# Patient Record
Sex: FEMALE | Race: WHITE | NOT HISPANIC OR LATINO
[De-identification: names, ages, dates, MRNs, and addresses within clinical notes are randomized per-mention and may not be internally consistent; named-entity substitution may affect disease eponyms.]

---

## 2017-03-11 ENCOUNTER — TRANSCRIPTION ENCOUNTER (OUTPATIENT)
Age: 77
End: 2017-03-11

## 2019-06-17 PROBLEM — Z00.00 ENCOUNTER FOR PREVENTIVE HEALTH EXAMINATION: Status: ACTIVE | Noted: 2019-06-17

## 2019-06-26 ENCOUNTER — APPOINTMENT (OUTPATIENT)
Dept: ORTHOPEDIC SURGERY | Facility: CLINIC | Age: 79
End: 2019-06-26
Payer: MEDICARE

## 2019-06-26 VITALS — WEIGHT: 196 LBS | HEIGHT: 65 IN | BODY MASS INDEX: 32.65 KG/M2

## 2019-06-26 PROCEDURE — 99214 OFFICE O/P EST MOD 30 MIN: CPT

## 2019-06-26 PROCEDURE — 73562 X-RAY EXAM OF KNEE 3: CPT | Mod: 50

## 2019-06-26 PROCEDURE — 99204 OFFICE O/P NEW MOD 45 MIN: CPT

## 2019-06-26 NOTE — PHYSICAL EXAM
[de-identified] : Right knee exam has medial joint line tenderness patient's range of motion is 0-135° or soft tissue and no effusion.\par \par Left knee has a well-healed anterior incision range of motion 0-130° with no crepitus. She is neurovascularly intact in both lower extremities. [de-identified] : AP standing individual levels and sunrise views were obtained showing near complete cartilage loss the medial aspect of standing right knee projection. Left knee has a well aligned rotating platform knee replacement.

## 2019-06-26 NOTE — DISCUSSION/SUMMARY
[de-identified] : Patient will continue with her physical therapy regimen she will followup with me as needed. She'll be referred to pain management for her peripheral neuropathy.

## 2019-06-26 NOTE — DISCUSSION/SUMMARY
[de-identified] : Patient will continue with her physical therapy regimen she will followup with me as needed. She'll be referred to pain management for her peripheral neuropathy.

## 2019-06-26 NOTE — PHYSICAL EXAM
[de-identified] : Right knee exam has medial joint line tenderness patient's range of motion is 0-135° or soft tissue and no effusion.\par \par Left knee has a well-healed anterior incision range of motion 0-130° with no crepitus. She is neurovascularly intact in both lower extremities. [de-identified] : AP standing individual levels and sunrise views were obtained showing near complete cartilage loss the medial aspect of standing right knee projection. Left knee has a well aligned rotating platform knee replacement.

## 2019-06-26 NOTE — HISTORY OF PRESENT ILLNESS
[de-identified] : FARA KNEE PAIN - LEFT KNEE HAS BECOME MORE PAINFUL OVER THE PAST MONTH. HAS BEEN ATTENDING PT - NOTICED SHIN PAIN AS WELL.\par \par RIGHT KNEE WAS REPLACED 11/04/16\par \par SENT FOR NEW XRAYS TODAY

## 2019-06-26 NOTE — HISTORY OF PRESENT ILLNESS
[de-identified] : FARA KNEE PAIN - LEFT KNEE HAS BECOME MORE PAINFUL OVER THE PAST MONTH. HAS BEEN ATTENDING PT - NOTICED SHIN PAIN AS WELL.\par \par RIGHT KNEE WAS REPLACED 11/04/16\par \par SENT FOR NEW XRAYS TODAY

## 2019-07-10 ENCOUNTER — APPOINTMENT (OUTPATIENT)
Dept: PHYSICAL MEDICINE AND REHAB | Facility: CLINIC | Age: 79
End: 2019-07-10
Payer: MEDICARE

## 2019-07-10 VITALS — HEIGHT: 65 IN | WEIGHT: 199 LBS | BODY MASS INDEX: 33.15 KG/M2

## 2019-07-10 DIAGNOSIS — Z86.718 PERSONAL HISTORY OF OTHER VENOUS THROMBOSIS AND EMBOLISM: ICD-10-CM

## 2019-07-10 DIAGNOSIS — Z86.73 PERSONAL HISTORY OF TRANSIENT ISCHEMIC ATTACK (TIA), AND CEREBRAL INFARCTION W/OUT RESIDUAL DEFICITS: ICD-10-CM

## 2019-07-10 DIAGNOSIS — Z86.79 PERSONAL HISTORY OF OTHER DISEASES OF THE CIRCULATORY SYSTEM: ICD-10-CM

## 2019-07-10 DIAGNOSIS — Z78.9 OTHER SPECIFIED HEALTH STATUS: ICD-10-CM

## 2019-07-10 DIAGNOSIS — R73.03 PREDIABETES.: ICD-10-CM

## 2019-07-10 DIAGNOSIS — Z86.39 PERSONAL HISTORY OF OTHER ENDOCRINE, NUTRITIONAL AND METABOLIC DISEASE: ICD-10-CM

## 2019-07-10 PROCEDURE — 99214 OFFICE O/P EST MOD 30 MIN: CPT

## 2019-07-10 RX ORDER — OXYCODONE HYDROCHLORIDE AND ACETAMINOPHEN 7.5; 325 MG/1; MG/1
7.5-325 TABLET ORAL
Refills: 0 | Status: ACTIVE | COMMUNITY

## 2019-07-10 RX ORDER — METOPROLOL SUCCINATE 25 MG/1
25 TABLET, EXTENDED RELEASE ORAL
Refills: 0 | Status: ACTIVE | COMMUNITY

## 2019-07-10 RX ORDER — APIXABAN 5 MG/1
5 TABLET, FILM COATED ORAL
Refills: 0 | Status: ACTIVE | COMMUNITY

## 2019-07-10 RX ORDER — PRAVASTATIN SODIUM 40 MG/1
40 TABLET ORAL
Refills: 0 | Status: ACTIVE | COMMUNITY

## 2019-07-10 RX ORDER — LATANOPROST/PF 0.005 %
0.01 DROPS OPHTHALMIC (EYE)
Qty: 2 | Refills: 0 | Status: ACTIVE | COMMUNITY
Start: 2019-06-27

## 2019-07-10 RX ORDER — PANTOPRAZOLE SODIUM 40 MG/10ML
40 INJECTION, POWDER, FOR SOLUTION INTRAVENOUS
Refills: 0 | Status: ACTIVE | COMMUNITY

## 2019-07-10 RX ORDER — FAMOTIDINE 20 MG/1
20 TABLET, FILM COATED ORAL
Refills: 0 | Status: ACTIVE | COMMUNITY

## 2019-07-10 RX ORDER — METFORMIN ER 750 MG 750 MG/1
750 TABLET ORAL
Refills: 0 | Status: ACTIVE | COMMUNITY

## 2019-07-10 RX ORDER — LISINOPRIL AND HYDROCHLOROTHIAZIDE TABLETS 10; 12.5 MG/1; MG/1
10-12.5 TABLET ORAL
Refills: 0 | Status: ACTIVE | COMMUNITY

## 2019-07-10 RX ORDER — ZOLPIDEM TARTRATE 10 MG/1
10 TABLET ORAL
Refills: 0 | Status: ACTIVE | COMMUNITY

## 2019-07-10 NOTE — HISTORY OF PRESENT ILLNESS
[FreeTextEntry1] : Location: leg pain\par Quality: burning\par Severity: 4/10\par Duration: years\par Timing: chronic\par Context: atraumatic\par Aggravating Factors:standing, walking\par Alleviating Factors: ice, meds, lying\par Associated Symptoms: denies weight loss, fever, chills, change in bowel/bladder habits, redness, warmth, weakness, +numbness/tingling, radiation down leg\par Prior Studies: EMG\par

## 2019-07-10 NOTE — ASSESSMENT
[FreeTextEntry1] : Will slowly increase Gabapentin - 900 mg qhs but 600 mg qam and qpm.  Will request EMG report from Dr Alexis Solano and MRI lumbar from Dr Ricardo Jones.

## 2019-07-10 NOTE — PHYSICAL EXAM
[FreeTextEntry1] : [place] is a 79 year female \par Constitutional: healthy appearing, NAD, and obese\par \par LUMBAR\par \par Gait: antalgic\par \par Inspection: no erythema, warmth\par Spine: no TTP in spinous process, SI joint, sacrum\par Bony palpation: no TTP in GT\par \par Soft tissue palpation hip: no TTP in gluteus damian, medius\par Soft tissue palpation of spine: no TTP in lumbar paraspinals\par \par 5/5 bilateral HF, KE, DF, PF \par sensation intact in bilat LE except decreased in left lower leg anteriorly\par reflexes: knee and ankle 0 bilat\par \par Special tests: neg seated SLR\par \par

## 2019-07-19 ENCOUNTER — TRANSCRIPTION ENCOUNTER (OUTPATIENT)
Age: 79
End: 2019-07-19

## 2019-08-08 ENCOUNTER — APPOINTMENT (OUTPATIENT)
Dept: PHYSICAL MEDICINE AND REHAB | Facility: CLINIC | Age: 79
End: 2019-08-08
Payer: MEDICARE

## 2019-08-08 PROCEDURE — 99214 OFFICE O/P EST MOD 30 MIN: CPT

## 2019-08-08 NOTE — PHYSICAL EXAM
[FreeTextEntry1] : KY is a 79 year female \par Constitutional: healthy appearing, NAD, and obese\par \par LUMBAR\par \par Gait: antalgic\par \par Inspection: no erythema, warmth\par Spine: no TTP in spinous process, SI joint, sacrum\par Bony palpation: no TTP in GT\par \par Soft tissue palpation hip: no TTP in gluteus damian, medius\par Soft tissue palpation of spine: no TTP in lumbar paraspinals\par \par 5/5 bilateral HF, KE, DF, PF \par sensation intact in bilat LE except decreased in lower leg laterally\par reflexes: knee and ankle 0 bilat\par \par Special tests: neg seated SLR\par

## 2019-08-08 NOTE — ASSESSMENT
[FreeTextEntry1] : Try Gralise for a few days while off regular Gabapentin.  Wrote it down for her. Let me know next wk how it works.  EMG and MRI were not sent to us.

## 2019-08-08 NOTE — HISTORY OF PRESENT ILLNESS
[FreeTextEntry1] : Location: leg pain\par Quality: burning\par Severity: 7/10 because of cellulitis\par Duration: years\par Timing: chronic\par Context: atraumatic\par Aggravating Factors:standing, walking\par Alleviating Factors: ice, meds, lying\par Associated Symptoms: denies weight loss, fever, chills, change in bowel/bladder habits, redness, warmth, weakness, +numbness/tingling, radiation down leg\par Prior Studies: EMG\par \par 7/2019 developed cellulitis in right lower leg; had IV Abx for 2 days

## 2019-09-10 ENCOUNTER — APPOINTMENT (OUTPATIENT)
Dept: PHYSICAL MEDICINE AND REHAB | Facility: CLINIC | Age: 79
End: 2019-09-10
Payer: MEDICARE

## 2019-09-10 PROCEDURE — 99214 OFFICE O/P EST MOD 30 MIN: CPT

## 2019-09-11 NOTE — ASSESSMENT
[FreeTextEntry1] : Gralise did not help.   Increase Gabapentin to 900 in am, 600 pm, and 900 qhs.  Seeing Neurologist in 6 wk.  Reviewed hip abd.  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

## 2019-09-11 NOTE — HISTORY OF PRESENT ILLNESS
[FreeTextEntry1] : Location: leg pain\par Quality: burning\par Severity: 3/10 \par Duration: years\par Timing: chronic\par Context: atraumatic\par Aggravating Factors:standing, walking\par Alleviating Factors: ice, meds, lying\par Associated Symptoms: denies weight loss, fever, chills, change in bowel/bladder habits, redness, warmth, weakness, +numbness/tingling, radiation down leg\par Prior Studies: EMG\par \par 7/2019 developed cellulitis in right lower leg; had IV Abx for 2 days

## 2019-10-08 ENCOUNTER — APPOINTMENT (OUTPATIENT)
Dept: PHYSICAL MEDICINE AND REHAB | Facility: CLINIC | Age: 79
End: 2019-10-08
Payer: MEDICARE

## 2019-10-08 PROCEDURE — 99214 OFFICE O/P EST MOD 30 MIN: CPT

## 2019-10-08 RX ORDER — GABAPENTIN 600 MG/1
600 TABLET, FILM COATED ORAL
Refills: 0 | Status: DISCONTINUED | COMMUNITY
End: 2019-10-08

## 2019-10-08 NOTE — HISTORY OF PRESENT ILLNESS
[FreeTextEntry1] : Location: leg pain\par Quality: burning\par Severity: 6/10\par Duration: years\par Timing: chronic\par Context: atraumatic\par Aggravating Factors: standing, walking\par Alleviating Factors: ice, meds, lying\par Associated Symptoms: denies weight loss, fever, chills, change in bowel/bladder habits, redness, warmth, weakness, +numbness/tingling, radiation down leg\par Prior Studies: EMG\par \par 7/2019 developed cellulitis in right lower leg; had IV Abx for 2 days

## 2019-10-08 NOTE — ASSESSMENT
[FreeTextEntry1] : Gralise did not help. Will increase Gabapentin to 900 mg 3 times a day. Taught heel raises and wall squats.  See vascular to check circulation.  I wrote down for her.  Has appt with Neurologist in a couple wk.

## 2019-10-10 ENCOUNTER — APPOINTMENT (OUTPATIENT)
Dept: ORTHOPEDIC SURGERY | Facility: CLINIC | Age: 79
End: 2019-10-10

## 2019-10-23 ENCOUNTER — APPOINTMENT (OUTPATIENT)
Dept: ORTHOPEDIC SURGERY | Facility: CLINIC | Age: 79
End: 2019-10-23
Payer: MEDICARE

## 2019-10-23 PROCEDURE — 99214 OFFICE O/P EST MOD 30 MIN: CPT

## 2019-10-23 NOTE — HISTORY OF PRESENT ILLNESS
[de-identified] : Patient is here with a complaint of burning in both knees. Her goal she is status post left knee replacement and also has a diagnosis of peripheral neuropathy.She states she has no pain with ambulation or stairclimbing.

## 2019-10-23 NOTE — DISCUSSION/SUMMARY
[de-identified] : Patient was advised that her symptoms are related to a peripheral neuropathy. Mechanically these have been no issues from her knee replacement she does have some mild medial arthritis of the right knee. That can be treated in the future with injections.

## 2019-10-23 NOTE — PHYSICAL EXAM
[de-identified] : Left knee on exam today the wound is well healed there is absolutely no warmth about the knee no effusion no swelling range of motion 0-125°. Right knee has some mild medial joint line tenderness range of motion 0-125°. Pulse lower extremity are neurovascularly intact

## 2019-11-18 ENCOUNTER — APPOINTMENT (OUTPATIENT)
Dept: PHYSICAL MEDICINE AND REHAB | Facility: CLINIC | Age: 79
End: 2019-11-18
Payer: MEDICARE

## 2019-11-18 PROCEDURE — 99214 OFFICE O/P EST MOD 30 MIN: CPT

## 2019-11-18 NOTE — PHYSICAL EXAM
[FreeTextEntry1] : KY is a 79 year female \par Constitutional: healthy appearing, NAD, and obese\par \par LUMBAR\par \par Gait: antalgic\par \par Inspection: no erythema, warmth\par Spine: no TTP in spinous process, SI joint, sacrum\par Bony palpation: no TTP in GT\par \par Soft tissue palpation hip: no TTP in gluteus damian, medius\par Soft tissue palpation of spine: no TTP in lumbar paraspinals\par \par 5/5 bilateral HF, KE, DF, PF \par sensation intact in bilat LE except decreased in lower leg laterally\par reflexes: knee and ankle 0 bilat\par \par \par

## 2019-11-18 NOTE — ASSESSMENT
[FreeTextEntry1] : Current Gabapentin dose is effective for her without side effects.  She does not want to increase dose any further.  Neurologist appt had to be reschedule til Dec 1.  Seeing PCP Dec 18 - reminded to get circulation checked. \par \par Reminded to do HEP.

## 2019-11-18 NOTE — HISTORY OF PRESENT ILLNESS
[FreeTextEntry1] : Location: leg pain\par Quality: burning\par Severity: 6/10\par Duration: years\par Timing: chronic\par Context: atraumatic\par Aggravating Factors: standing, walking\par Alleviating Factors: ice, meds, lying\par Associated Symptoms: denies weight loss, fever, chills, change in bowel/bladder habits, redness, warmth, weakness, +numbness/tingling chronic, radiation down leg\par Prior Studies: EMG\par \par 7/2019 developed cellulitis in right lower leg; had IV Abx for 2 days

## 2020-01-15 ENCOUNTER — APPOINTMENT (OUTPATIENT)
Dept: PHYSICAL MEDICINE AND REHAB | Facility: CLINIC | Age: 80
End: 2020-01-15
Payer: MEDICARE

## 2020-01-15 DIAGNOSIS — G62.9 POLYNEUROPATHY, UNSPECIFIED: ICD-10-CM

## 2020-01-15 PROCEDURE — 99214 OFFICE O/P EST MOD 30 MIN: CPT

## 2020-01-15 RX ORDER — GABAPENTIN 600 MG/1
600 TABLET, COATED ORAL 3 TIMES DAILY
Qty: 90 | Refills: 1 | Status: ACTIVE | COMMUNITY
Start: 2019-08-08 | End: 1900-01-01

## 2020-01-15 NOTE — HISTORY OF PRESENT ILLNESS
[FreeTextEntry1] : Location: leg pain\par Quality: burning\par Severity: 6/10\par Duration: years\par Timing: chronic\par Context: atraumatic\par Aggravating Factors: standing, walking\par Alleviating Factors: ice, meds, lying\par Associated Symptoms: denies weight loss, fever, chills, change in bowel/bladder habits, redness, warmth, weakness, +numbness/tingling chronic, radiation down leg\par Prior Studies: EMG\par \par 7/2019 developed cellulitis in right lower leg; had IV Abx for 2 days \par

## 2020-01-15 NOTE — PHYSICAL EXAM
[FreeTextEntry1] : KY is a 79 year female \par Constitutional: healthy appearing, NAD, and obese\par \par LUMBAR\par \par Gait: antalgic\par \par Inspection: no erythema, warmth\par Spine: no TTP in spinous process, SI joint, sacrum\par Bony palpation: no TTP in GT\par \par Soft tissue palpation hip: no TTP in gluteus damian, medius\par Soft tissue palpation of spine: no TTP in lumbar paraspinals\par \par 5/5 bilateral HF, KE, DF, PF \par sensation intact in bilat LE except decreased in lower leg laterally\par reflexes: knee and ankle 0 bilat\par

## 2020-03-18 RX ORDER — GABAPENTIN 300 MG/1
300 CAPSULE ORAL
Qty: 90 | Refills: 1 | Status: ACTIVE | COMMUNITY
Start: 2019-07-10 | End: 1900-01-01

## 2020-05-26 DIAGNOSIS — U07.1 COVID-19: ICD-10-CM

## 2021-06-25 ENCOUNTER — APPOINTMENT (OUTPATIENT)
Dept: ORTHOPEDIC SURGERY | Facility: CLINIC | Age: 81
End: 2021-06-25
Payer: MEDICARE

## 2021-06-25 PROCEDURE — 20611 DRAIN/INJ JOINT/BURSA W/US: CPT | Mod: RT

## 2021-06-25 PROCEDURE — 73562 X-RAY EXAM OF KNEE 3: CPT | Mod: 50

## 2021-06-25 PROCEDURE — 99214 OFFICE O/P EST MOD 30 MIN: CPT | Mod: 25

## 2021-06-25 RX ORDER — HYALURONATE SODIUM 30 MG/2 ML
30 SYRINGE (ML) INTRAARTICULAR
Qty: 3 | Refills: 0 | Status: ACTIVE | OUTPATIENT
Start: 2021-06-25

## 2021-06-25 NOTE — PHYSICAL EXAM
[de-identified] : Right knee has definite medial joint line tenderness of tissue was noted no effusion or warmth. Range of motion 0-25° it is stable to stress varus stress with full extension and 90° of flexion. Quad tone is good. [de-identified] : AP standing and lateral sunrise views were obtained showing near complete cartilage loss in the medial compartment standing AP projection of the right knee.

## 2021-06-25 NOTE — REASON FOR VISIT
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

## 2021-06-25 NOTE — DISCUSSION/SUMMARY
[de-identified] : Patient is on blood thinners so we advised her to use Tylenol only for pain relief. She'll also ice it as needed. We will order Orthovisc injections for the right knee and will notify her when they are available for use.

## 2021-06-25 NOTE — HISTORY OF PRESENT ILLNESS
[de-identified] : Patient is here status post 2016 left knee replacement. She did fairly well with that has occasional discomfort she is here mostly for insidious onset fairly severe right knee medial pain.

## 2021-06-25 NOTE — PROCEDURE
[de-identified] : Patient is given a cortisone injection to the lateral compartment right knee today. As of the posterior conditions and ultrasound guidance. Patient tolerated the procedure well.

## 2021-07-16 ENCOUNTER — APPOINTMENT (OUTPATIENT)
Dept: ORTHOPEDIC SURGERY | Facility: CLINIC | Age: 81
End: 2021-07-16
Payer: MEDICARE

## 2021-07-16 PROCEDURE — 99213 OFFICE O/P EST LOW 20 MIN: CPT | Mod: 25

## 2021-07-16 PROCEDURE — 20611 DRAIN/INJ JOINT/BURSA W/US: CPT | Mod: RT

## 2021-07-16 NOTE — PROCEDURE
[de-identified] : Injection 1/3\par Patient was given an Orthovisc injection 30mg/2ml  in the lateral compartment of the knee. Injection is performed under sterile conditions with ultrasound guidance. Patient tolerated procedure well. \par

## 2021-07-16 NOTE — HISTORY OF PRESENT ILLNESS
[de-identified] : pt presents today for 1/3 orthovisc of the right knee [Stable] : stable [4] : a current pain level of 4/10 [Walking] : walking

## 2021-07-16 NOTE — PHYSICAL EXAM
[de-identified] : Right knee has definite medial joint line tenderness of tissue was noted no effusion or warmth. Range of motion 0-25° it is stable to stress varus stress with full extension and 90° of flexion. Quad tone is goo

## 2021-07-26 ENCOUNTER — APPOINTMENT (OUTPATIENT)
Dept: ORTHOPEDIC SURGERY | Facility: CLINIC | Age: 81
End: 2021-07-26

## 2021-07-29 ENCOUNTER — APPOINTMENT (OUTPATIENT)
Dept: ORTHOPEDIC SURGERY | Facility: CLINIC | Age: 81
End: 2021-07-29
Payer: MEDICARE

## 2021-07-29 DIAGNOSIS — M17.11 UNILATERAL PRIMARY OSTEOARTHRITIS, RIGHT KNEE: ICD-10-CM

## 2021-07-29 PROCEDURE — 20611 DRAIN/INJ JOINT/BURSA W/US: CPT | Mod: RT

## 2021-07-29 PROCEDURE — 99213 OFFICE O/P EST LOW 20 MIN: CPT | Mod: 25

## 2021-07-29 NOTE — PROCEDURE
[de-identified] : After discussion of the risks and benefits, the patient presented for their Orthovisc injection to the knee.  Confirmed that the patient does not have history of prior adverse reactions, active infections, or relevant allergies.  There was no effusion, erythema, or warmth, and the skin was clear.\par The skin was prepped in the usual sterile manner, ethyl chloride spray was used as a topical anesthetic.  A needle was inserted \par UTILIZING ULTRASOUND GUIDANCE TO LOCALIZE THE NEEDLE IN THE KNEE JOINT\par into the RIGHT KNEE via an anterolateral approach.  Viscosupplement was then slowly injected, The injection was completed without complication and a bandage was applied.\par The patient tolerated the procedure well and was given post-injection instructions: no exercise x 48 hours, cold packs and analgesics prn

## 2021-07-29 NOTE — PHYSICAL EXAM
[de-identified] : Right knee\par \par Constitutional: \par The patient is healthy-appearing and in no apparent distress. \par \par Gait:\par The patient ambulates with a normal gait and no limp.\par \par Cardiovascular System: \par The capillary refill is less than 2 seconds. \par \par Skin: \par There are no skin abnormalities.\par \par Right Knee:\par  \par Bony Palpation: \par There is tenderness of the medial joint line. \par There is tenderness of the lateral joint line.\par There is tenderness of the medial femoral chondyle.\par There is tenderness of the lateral femoral chondyle.\par There is no tenderness of the tibial tubercle.\par There is no tenderness of the superior patella.\par There is no tenderness of the inferior patella.\par There is tenderness of the medial patellar facet.\par There is tenderness of the lateral patellar facet.\par \par Soft Tissue Palpation: \par There is no tenderness of the medial retinaculum.\par There is no tenderness of the lateral retinaculum.\par There is no tenderness of the quadriceps tendon.\par There is no tenderness of the patella tendon.\par There is no tenderness of the ITB.\par There is no tenderness of the pes anserine.\par \par Active Range of Motion: \par The range of motion at the knee actively and passively is 3 - 125. \par \par Strength: \par There is 5/5 hip flexion and 5/5 knee flexion and extension.  \par \par Psychiatric: \par The patient demonstrates a normal mood and affect and is active and alert\par \par

## 2021-07-29 NOTE — ASSESSMENT
[FreeTextEntry1] : Discussed at length with patient and her arthritis and injection protocols.  Patient to follow up with Dr. Rolle in one week\par

## 2021-08-02 ENCOUNTER — APPOINTMENT (OUTPATIENT)
Dept: ORTHOPEDIC SURGERY | Facility: CLINIC | Age: 81
End: 2021-08-02
Payer: MEDICARE

## 2021-08-02 PROCEDURE — 99214 OFFICE O/P EST MOD 30 MIN: CPT | Mod: 25

## 2021-08-02 PROCEDURE — 20611 DRAIN/INJ JOINT/BURSA W/US: CPT | Mod: RT

## 2021-08-02 NOTE — PHYSICAL EXAM
[de-identified] : Right knee has definite medial joint line tenderness of tissue was noted no effusion or warmth. Range of motion 0-125° it is stable to stress varus stress with full extension and 90° of flexion. Quad tone is good.

## 2021-08-02 NOTE — PROCEDURE
[de-identified] : Patient was given the third and final right knee Orthovisc injection today. This is done and sterile conditions and ultrasound guidance. Patient tolerated the procedure well.

## 2023-05-09 DIAGNOSIS — M25.569 PAIN IN UNSPECIFIED KNEE: ICD-10-CM
